# Patient Record
Sex: MALE | Race: OTHER | HISPANIC OR LATINO | URBAN - METROPOLITAN AREA
[De-identification: names, ages, dates, MRNs, and addresses within clinical notes are randomized per-mention and may not be internally consistent; named-entity substitution may affect disease eponyms.]

---

## 2022-04-22 ENCOUNTER — APPOINTMENT (RX ONLY)
Dept: URBAN - METROPOLITAN AREA CLINIC 304 | Facility: CLINIC | Age: 27
Setting detail: DERMATOLOGY
End: 2022-04-22

## 2022-04-22 DIAGNOSIS — L30.4 ERYTHEMA INTERTRIGO: ICD-10-CM

## 2022-04-22 PROCEDURE — ? COUNSELING

## 2022-04-22 PROCEDURE — 99202 OFFICE O/P NEW SF 15 MIN: CPT

## 2022-04-22 ASSESSMENT — LOCATION DETAILED DESCRIPTION DERM
LOCATION DETAILED: RIGHT INGUINAL CREASE
LOCATION DETAILED: SUPRAPUBIC SKIN

## 2022-04-22 ASSESSMENT — LOCATION SIMPLE DESCRIPTION DERM: LOCATION SIMPLE: GROIN

## 2022-04-22 ASSESSMENT — LOCATION ZONE DERM: LOCATION ZONE: TRUNK
